# Patient Record
Sex: MALE | Race: WHITE
[De-identification: names, ages, dates, MRNs, and addresses within clinical notes are randomized per-mention and may not be internally consistent; named-entity substitution may affect disease eponyms.]

---

## 2018-07-18 ENCOUNTER — HOSPITAL ENCOUNTER (EMERGENCY)
Dept: HOSPITAL 58 - ED | Age: 68
Discharge: HOME | End: 2018-07-18

## 2018-07-18 VITALS — DIASTOLIC BLOOD PRESSURE: 99 MMHG | SYSTOLIC BLOOD PRESSURE: 172 MMHG | TEMPERATURE: 99.4 F

## 2018-07-18 VITALS — BODY MASS INDEX: 31.8 KG/M2

## 2018-07-18 DIAGNOSIS — I10: ICD-10-CM

## 2018-07-18 DIAGNOSIS — M10.071: Primary | ICD-10-CM

## 2018-07-18 DIAGNOSIS — F17.210: ICD-10-CM

## 2018-07-18 PROCEDURE — 99283 EMERGENCY DEPT VISIT LOW MDM: CPT

## 2018-07-18 PROCEDURE — 84550 ASSAY OF BLOOD/URIC ACID: CPT

## 2018-07-18 PROCEDURE — 85025 COMPLETE CBC W/AUTO DIFF WBC: CPT

## 2018-07-18 PROCEDURE — 36415 COLL VENOUS BLD VENIPUNCTURE: CPT

## 2018-07-18 PROCEDURE — 80053 COMPREHEN METABOLIC PANEL: CPT
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
## 2018-07-18 NOTE — DI
EXAM:  Three views of the right foot. 

  

History:  Right foot pain. 

  

Findings:  No acute fracture or dislocation.  Joint spaces are relatively preserved.  Diffuse soft ti
ssue swelling and tibiotalar joint effusion. 

  

Impression: 

1.  No acute osseous abnormality. 

2.  Diffuse soft tissue swelling and tibiotalar joint effusion.

## 2018-07-18 NOTE — DI
EXAM:  Three views of the right ankle. 

  

History:  Right ankle pain. 

  

Findings:  No acute fracture or dislocation.  Diffuse soft tissue swelling and an ankle joint effusio
n.  Joint spaces are relatively preserved. 

  

Impression: 

1.  No acute osseous abnormality. 

2.  Diffuse soft tissue swelling and tibiotalar joint effusion

## 2019-07-23 ENCOUNTER — HOSPITAL ENCOUNTER (OUTPATIENT)
Dept: HOSPITAL 58 - SURG | Age: 69
Discharge: HOME | End: 2019-07-23
Attending: INTERNAL MEDICINE

## 2019-07-23 VITALS — TEMPERATURE: 98.8 F

## 2019-07-23 VITALS — BODY MASS INDEX: 31.8 KG/M2

## 2019-07-23 VITALS — SYSTOLIC BLOOD PRESSURE: 103 MMHG | DIASTOLIC BLOOD PRESSURE: 69 MMHG

## 2019-07-23 DIAGNOSIS — D12.2: ICD-10-CM

## 2019-07-23 DIAGNOSIS — D12.4: ICD-10-CM

## 2019-07-23 DIAGNOSIS — D12.3: ICD-10-CM

## 2019-07-23 DIAGNOSIS — D12.8: ICD-10-CM

## 2019-07-23 DIAGNOSIS — Z12.11: Primary | ICD-10-CM

## 2019-07-24 NOTE — OP
INDICATIONS FOR PROCEDURE: 69 year old gentleman presents for a screening 
colonoscopy exam. He last had a colonoscopy over 12 hours ago. 



MEDICATIONS:  SEE ANESTHESIA NOTES.



PROCEDURE:  COLONOSCOPY. SNARE POLYPECTOMY. 



REPORT:  The risks, benefits, alternatives and limitations were discussed in 
detail with the patient.  Informed consent was obtained.



After adequate sedation was achieved, a digital rectal exam revealed good tone, 
no masses.  The colonoscope was introduced into the rectum and advanced under 
direct visual guidance to the cecum.  The cecum was identified by the 
appendiceal orifice and IC valve. He does a long redundant colon to reach the 
cecum requires external pressure. I slowly withdrew the scope in 
circumferential manner examining the mucosa quite carefully. I looked on the 
proximal and distal sides of folds and flexures as best as possible. I was able 
to retroflex the scope in the right colon as well as the left colon to increase 
visualization. In the ascending colon there was two polyps about 6mm in size 
and sessile. I removed both of these by snare technique. They were placed in 
the same pathology jar. In the proximal midtransverse colon there was a semi-
sessile 9mm polyp that I removed by snare technique. In the distal sigmoid area 
there was a semi-sessile 6 or 7 mm polyp removed by snare technique. In the 
distal rectum noted on retroflex view there was two polyps 3mm and 5mm in size 
and sessile both removed by snare technique. The smaller one was completely 
destroyed. I noted in the sigmoid colon diverticulosis. No other abnormalities 
were noted including on forward and retroflex views including the anal canal. 
The prep was adequate. Withdraw time was 21 minutes and 36 seconds. The patient 
tolerated the procedure well with stable vital signs and pulse oximetry 
throughout.



IMPRESSION:

1. 6 Colonic polyps removed 

2. Scatter diverticulosis in the sigmoid



RECOMMENDATIONS: 

1. High fiber diet 

2. Office visit as needed 

3.  Await polyp pathology results and if everything is benign I recommend 
repeat colonoscopy examination in 3 years or sooner if there are signs or 
symptoms to indicate otherwise. 



CC: Dr. Terrence PRECIADO